# Patient Record
Sex: FEMALE | Race: WHITE | Employment: OTHER | URBAN - METROPOLITAN AREA
[De-identification: names, ages, dates, MRNs, and addresses within clinical notes are randomized per-mention and may not be internally consistent; named-entity substitution may affect disease eponyms.]

---

## 2018-05-10 ENCOUNTER — TRANSFERRED RECORDS (OUTPATIENT)
Dept: HEALTH INFORMATION MANAGEMENT | Facility: CLINIC | Age: 73
End: 2018-05-10

## 2018-05-10 LAB
ALT SERPL-CCNC: 17 IU/L (ref 12–68)
CHOLEST SERPL-MCNC: 172 MG/DL
CREAT SERPL-MCNC: 0.77 MG/DL (ref 0.55–1.02)
GFR SERPL CREATININE-BSD FRML MDRD: >60 ML/MIN/1.73M2
GLUCOSE SERPL-MCNC: 92 MG/DL (ref 74–106)
HDLC SERPL-MCNC: 52 MG/DL
LDLC SERPL CALC-MCNC: 100 MG/DL
POTASSIUM SERPL-SCNC: 3.8 MMOL/L (ref 3.5–5.1)
TRIGL SERPL-MCNC: 101 MG/DL
TSH SERPL-ACNC: 3.03 UIU/ML (ref 0.36–3.74)

## 2018-05-21 ENCOUNTER — TRANSFERRED RECORDS (OUTPATIENT)
Dept: HEALTH INFORMATION MANAGEMENT | Facility: CLINIC | Age: 73
End: 2018-05-21

## 2018-06-25 ENCOUNTER — TRANSFERRED RECORDS (OUTPATIENT)
Dept: HEALTH INFORMATION MANAGEMENT | Facility: CLINIC | Age: 73
End: 2018-06-25

## 2018-07-25 ENCOUNTER — MEDICAL CORRESPONDENCE (OUTPATIENT)
Dept: HEALTH INFORMATION MANAGEMENT | Facility: CLINIC | Age: 73
End: 2018-07-25

## 2018-08-15 ENCOUNTER — PRE VISIT (OUTPATIENT)
Dept: NURSING | Facility: CLINIC | Age: 73
End: 2018-08-15

## 2018-08-15 NOTE — TELEPHONE ENCOUNTER
[facility] CLINICAL DOCUMENTATION    Pre-Visit Planning   PREVISIT INFORMATION                                                    Charles Briscoe scheduled for future visit at [facility] specialty clinics.    Patient is scheduled to see Dr. Mcgowan   on 8/16/18   Reason for visit: Muscle wasting and atrophy, not elsewhere classified, right lower leg  Referring provider Dr. Isidro  Has patient seen previous specialist? Yes.  Name of provider Dr. Lira, Clinic/Facility  In [place]  Medical Records:  will bring history    REVIEW                                                      New patient packet mailed to patient: Yes  Medication reconciliation complete: Yes      No current outpatient prescriptions on file.       Allergies: Review of patient's allergies indicates not on file.    (insert provider dot-phrase for provider specific visit requirements)    PLAN/FOLLOW-UP NEEDED                                                      Previsit review complete.  Patient will see provider at future scheduled appointment.     Patient Reminders Given:  Please, make sure you bring an updated list of your medications.   If you are having a procedure, please, present 15 minutes early.  If you need to cancel or reschedule,please call [phone].    Kriss Toscano

## 2018-08-16 ENCOUNTER — OFFICE VISIT (OUTPATIENT)
Dept: NEUROLOGY | Facility: CLINIC | Age: 73
End: 2018-08-16
Payer: COMMERCIAL

## 2018-08-16 ENCOUNTER — TELEPHONE (OUTPATIENT)
Dept: NEUROLOGY | Facility: CLINIC | Age: 73
End: 2018-08-16

## 2018-08-16 VITALS
WEIGHT: 125.1 LBS | BODY MASS INDEX: 19.63 KG/M2 | HEART RATE: 77 BPM | DIASTOLIC BLOOD PRESSURE: 68 MMHG | HEIGHT: 67 IN | OXYGEN SATURATION: 98 % | SYSTOLIC BLOOD PRESSURE: 120 MMHG

## 2018-08-16 DIAGNOSIS — F40.240 CLAUSTROPHOBIA: Primary | ICD-10-CM

## 2018-08-16 DIAGNOSIS — G35 MULTIPLE SCLEROSIS (H): Primary | ICD-10-CM

## 2018-08-16 LAB
ALBUMIN SERPL-MCNC: 4.4 G/DL (ref 3.4–5)
ALP SERPL-CCNC: 65 U/L (ref 40–150)
ALT SERPL W P-5'-P-CCNC: 24 U/L (ref 0–50)
ANION GAP SERPL CALCULATED.3IONS-SCNC: 6 MMOL/L (ref 3–14)
AST SERPL W P-5'-P-CCNC: 17 U/L (ref 0–45)
BASOPHILS # BLD AUTO: 0.1 10E9/L (ref 0–0.2)
BASOPHILS NFR BLD AUTO: 1.1 %
BILIRUB SERPL-MCNC: 0.5 MG/DL (ref 0.2–1.3)
BUN SERPL-MCNC: 10 MG/DL (ref 7–30)
CALCIUM SERPL-MCNC: 9.2 MG/DL (ref 8.5–10.1)
CHLORIDE SERPL-SCNC: 104 MMOL/L (ref 94–109)
CO2 SERPL-SCNC: 31 MMOL/L (ref 20–32)
CREAT SERPL-MCNC: 0.73 MG/DL (ref 0.52–1.04)
DIFFERENTIAL METHOD BLD: NORMAL
EOSINOPHIL # BLD AUTO: 0 10E9/L (ref 0–0.7)
EOSINOPHIL NFR BLD AUTO: 0.5 %
ERYTHROCYTE [DISTWIDTH] IN BLOOD BY AUTOMATED COUNT: 13.9 % (ref 10–15)
GFR SERPL CREATININE-BSD FRML MDRD: 78 ML/MIN/1.7M2
GLUCOSE SERPL-MCNC: 125 MG/DL (ref 70–99)
HCT VFR BLD AUTO: 43.2 % (ref 35–47)
HGB BLD-MCNC: 13.8 G/DL (ref 11.7–15.7)
IMM GRANULOCYTES # BLD: 0 10E9/L (ref 0–0.4)
IMM GRANULOCYTES NFR BLD: 0.3 %
LYMPHOCYTES # BLD AUTO: 2.4 10E9/L (ref 0.8–5.3)
LYMPHOCYTES NFR BLD AUTO: 37 %
MCH RBC QN AUTO: 28.5 PG (ref 26.5–33)
MCHC RBC AUTO-ENTMCNC: 31.9 G/DL (ref 31.5–36.5)
MCV RBC AUTO: 89 FL (ref 78–100)
MONOCYTES # BLD AUTO: 0.4 10E9/L (ref 0–1.3)
MONOCYTES NFR BLD AUTO: 6.1 %
NEUTROPHILS # BLD AUTO: 3.6 10E9/L (ref 1.6–8.3)
NEUTROPHILS NFR BLD AUTO: 55 %
PLATELET # BLD AUTO: 259 10E9/L (ref 150–450)
POTASSIUM SERPL-SCNC: 4.6 MMOL/L (ref 3.4–5.3)
PROT SERPL-MCNC: 7.7 G/DL (ref 6.8–8.8)
RBC # BLD AUTO: 4.84 10E12/L (ref 3.8–5.2)
SODIUM SERPL-SCNC: 141 MMOL/L (ref 133–144)
TSH SERPL DL<=0.005 MIU/L-ACNC: 3.18 MU/L (ref 0.4–4)
VIT B12 SERPL-MCNC: 569 PG/ML (ref 193–986)
WBC # BLD AUTO: 6.6 10E9/L (ref 4–11)

## 2018-08-16 PROCEDURE — 86787 VARICELLA-ZOSTER ANTIBODY: CPT | Performed by: PSYCHIATRY & NEUROLOGY

## 2018-08-16 PROCEDURE — 86618 LYME DISEASE ANTIBODY: CPT | Performed by: PSYCHIATRY & NEUROLOGY

## 2018-08-16 PROCEDURE — 86431 RHEUMATOID FACTOR QUANT: CPT | Performed by: PSYCHIATRY & NEUROLOGY

## 2018-08-16 PROCEDURE — 82306 VITAMIN D 25 HYDROXY: CPT | Performed by: PSYCHIATRY & NEUROLOGY

## 2018-08-16 PROCEDURE — 82164 ANGIOTENSIN I ENZYME TEST: CPT | Mod: 90 | Performed by: PSYCHIATRY & NEUROLOGY

## 2018-08-16 PROCEDURE — 82607 VITAMIN B-12: CPT | Performed by: PSYCHIATRY & NEUROLOGY

## 2018-08-16 PROCEDURE — 86480 TB TEST CELL IMMUN MEASURE: CPT | Performed by: PSYCHIATRY & NEUROLOGY

## 2018-08-16 PROCEDURE — 86255 FLUORESCENT ANTIBODY SCREEN: CPT | Mod: 90 | Performed by: PSYCHIATRY & NEUROLOGY

## 2018-08-16 PROCEDURE — 40000975 ZZHCL STATISTIC JC VIR AB INDEX INHIB: Mod: 90 | Performed by: PSYCHIATRY & NEUROLOGY

## 2018-08-16 PROCEDURE — 99205 OFFICE O/P NEW HI 60 MIN: CPT | Performed by: PSYCHIATRY & NEUROLOGY

## 2018-08-16 PROCEDURE — 83516 IMMUNOASSAY NONANTIBODY: CPT | Mod: 59 | Performed by: PSYCHIATRY & NEUROLOGY

## 2018-08-16 PROCEDURE — 86704 HEP B CORE ANTIBODY TOTAL: CPT | Performed by: PSYCHIATRY & NEUROLOGY

## 2018-08-16 PROCEDURE — 87340 HEPATITIS B SURFACE AG IA: CPT | Performed by: PSYCHIATRY & NEUROLOGY

## 2018-08-16 PROCEDURE — 86235 NUCLEAR ANTIGEN ANTIBODY: CPT | Performed by: PSYCHIATRY & NEUROLOGY

## 2018-08-16 PROCEDURE — 87522 HEPATITIS C REVRS TRNSCRPJ: CPT | Performed by: PSYCHIATRY & NEUROLOGY

## 2018-08-16 PROCEDURE — 99000 SPECIMEN HANDLING OFFICE-LAB: CPT | Performed by: PSYCHIATRY & NEUROLOGY

## 2018-08-16 PROCEDURE — 86706 HEP B SURFACE ANTIBODY: CPT | Performed by: PSYCHIATRY & NEUROLOGY

## 2018-08-16 PROCEDURE — 86038 ANTINUCLEAR ANTIBODIES: CPT | Performed by: PSYCHIATRY & NEUROLOGY

## 2018-08-16 PROCEDURE — 36415 COLL VENOUS BLD VENIPUNCTURE: CPT | Performed by: PSYCHIATRY & NEUROLOGY

## 2018-08-16 PROCEDURE — 86780 TREPONEMA PALLIDUM: CPT | Performed by: PSYCHIATRY & NEUROLOGY

## 2018-08-16 PROCEDURE — 84207 ASSAY OF VITAMIN B-6: CPT | Mod: 90 | Performed by: PSYCHIATRY & NEUROLOGY

## 2018-08-16 PROCEDURE — 87389 HIV-1 AG W/HIV-1&-2 AB AG IA: CPT | Performed by: PSYCHIATRY & NEUROLOGY

## 2018-08-16 PROCEDURE — 83516 IMMUNOASSAY NONANTIBODY: CPT | Performed by: PSYCHIATRY & NEUROLOGY

## 2018-08-16 PROCEDURE — 80050 GENERAL HEALTH PANEL: CPT | Performed by: PSYCHIATRY & NEUROLOGY

## 2018-08-16 RX ORDER — DIAZEPAM 5 MG
TABLET ORAL
Qty: 2 TABLET | Refills: 0 | COMMUNITY
Start: 2018-08-16 | End: 2018-08-28

## 2018-08-16 NOTE — MR AVS SNAPSHOT
After Visit Summary   8/16/2018    Marilee Briscoe    MRN: [id number]           Patient Information     Date Of Birth          1945        Visit Information        Provider Department      8/16/2018 9:00 AM Aly Mcgowan MD [facility]        Today's Diagnoses     Multiple sclerosis (H)    -  1      Care Instructions    Will get a MRI of you brain and spine    Will get blood work    You saw a neurology provider, Aly Mcgowan, today at the [facility].  You may have also met with the MS RN Care Coordinator.  In order to get a message to your MS Center provider, you should contact [phone]. You should contact us via this protocol if you have any of the following symptoms:    New or worsening neurologic symptoms that persist for 24-48 hours, such as:  o New onset of pain or marked worsening of pain  o Difficulty with speech, swallowing, or breathing  o New onset of vertigo or dizziness  o Change in bowel or bladder function (incontinence, difficulty urinating)    Increasing difficulty in self care    Marked changes in vision (double vision, blurred vision, graying of vision)    Change in mobility    Change in cognitive function    Falling    Worsening numbness, tingling or pain with a change in function    Worsening fatigue lasting more than 2 weeks  If you had labs completed today, we will contact you with the results.  If you are active in [employer], they will be released to you there.  Otherwise, your results will be provided to you via mail or telephone call.  Some results take up to 2 weeks for completion.  If you haven t heard anything about your lab results within 2 weeks, you can call or send a [employer] message to obtain your results.  If you have an MRI scheduled in the week or two prior to your next appointment, we will go over the results at your scheduled follow up appointment.  If you are not scheduled to see your MS  Center provider within about 2 weeks after your MRI, please call or send a [employer] message to obtain your results if you haven t heard anything within 2 weeks.  Please be aware that it takes at least 5 business days after routine MRIs for your results to be reviewed by both the radiologist and your doctor.  MRIs completed at facilities outside of the [facility] system take about 2 weeks in order for the MRI disc to be mailed to our clinic and uploaded into your medical record.    Please contact your pharmacy to request refills of your medications.   Please do your best to come to your appointments, and to arrive 15 minutes early to allow time for checking in.  [facility] reserves the right to terminate care of established patients if a patient misses three or more appointments in a clinic without providing notification within a 12-month period.    Developing Your Care Team  Individuals living with chronic illnesses like MS may be unaware that they are at risk for the same range of medical problems as everyone else.  This is why you must establish a relationship with other health care providers in addition to your Multiple Sclerosis doctor.  It can be difficult at times to figure out whether a health concern is related to your MS, or whether it is related to something else, such as hormonal changes, pseduoexacerbations, changes in your core body temperature, flu-like reactions to interferons, exercise, or infections.  Urinary tract infections (UTIs) are common culprits that can cause fatigue, weakness, or other  MS attack -like symptoms without classic symptoms of a UTI.  For this reason, if you call or come in to discuss symptoms, you may be asked to get in touch with your primary provider or another specialist, so that you receive the comprehensive care you need.  What is Multiple Sclerosis (MS)?  MS is a disease in which the nerve tissues in the brain and/or spinal cord are attacked by immune  cells in the body.  These immune cells are present in everyone, and their normal role is to fight off infections.  In people with MS, these cells change the way they function and cross into the nervous system.  Once there, they cause inflammation that damages the myelin (or the protective coating of a nerve cell, much like the plastic covering on an electrical cord) and parts of the nerve cell itself.  So far, a clear cause for this immune system dysfunction has not been found.  MS often starts out as the  relapsing-remitting  form.  This means there are episodes when you have symptoms, and other times when you recover to normal or near-normal.  Over time, if the damage to the nervous system continues, the disease can cause additional disability, such as difficulty walking.  If the relapses and nerve damage can be prevented with available medications, many patients with MS can go many years between relapses and have relatively little disability.  Remember: MS is a condition that changes and must be evaluated on an ongoing basis!  What is a Relapse? (Also called flare-ups, attacks, or exacerbations)  Relapses are due to the occurrence of inflammation in some part of the brain and/or spinal cord.  A relapse is new or recurrent symptoms which persist for at least 24 hours and sometimes worsen over 48 hours.  New symptoms need to be  by at least 1 month in order to be considered separate relapses. Most of the time, symptoms reach their maximal intensity within 2 weeks and then begin to slowly resolve.  At times, your symptoms may not recover fully for up to 6 months, depending on the severity of the episode.  The frequency of relapses is generally higher early in the disease, but can vary greatly among individuals with MS.  Improvement of symptoms for an individual is unpredictable with each relapse.    It is important to remember that an increase in symptoms and changes in function may not necessarily be a  relapse.  There are other factors that contribute to such changes, such as hot weather, increased body temperature, infection/illness, stress and sleep deprivation.  The worsening of symptoms may feel like a relapse when in reality it is not.  These episodes are referred to as pseudorelapses.  Once the underlying cause is addressed, symptoms usually fade away and you feel better.  If you experience a worsening of symptoms that lasts more than 48 hours and does not improve with cooling down, decreasing stress, or treatment of an infection, please call us and we can help to better determine whether you are having a pseudorelapse versus a relapse.                    Follow-ups after your visit        Follow-up notes from your care team     Return in about 2 weeks (around 8/30/2018).      Your next 10 appointments already scheduled     Aug 27, 2018 11:00 AM CDT   MR BRAIN W/O & W CONTRAST with MGMR1   [facility]    [id number] [place] [id number] [phone]           Take your medicines as usual, unless your doctor tells you not to. Bring a list of your current medicines to your exam (including vitamins, minerals and over-the-counter drugs).  You may or may not receive intravenous (IV) contrast for this exam pending the discretion of the Radiologist.  You do not need to do anything special to prepare.  The MRI machine uses a strong magnet. Please wear clothes without metal (snaps, zippers). A sweatsuit works well, or we may give you a hospital gown.  Please remove any body piercings and hair extensions before you arrive. You will also remove watches, jewelry, hairpins, wallets, dentures, partial dental plates and hearing aids. You may wear contact lenses, and you may be able to wear your rings. We have a safe place to keep your personal items, but it is safer to leave them at home.  **IMPORTANT** THE INSTRUCTIONS BELOW ARE ONLY FOR THOSE PATIENTS WHO  HAVE BEEN PRESCRIBED SEDATION OR GENERAL ANESTHESIA DURING THEIR MRI PROCEDURE:  IF YOUR DOCTOR PRESCRIBED ORAL SEDATION (take medicine to help you relax during your exam):   You must get the medicine from your doctor (oral medication) before you arrive. Bring the medicine to the exam. Do not take it at home. You ll be told when to take it upon arriving for your exam.   Arrive one hour early. Bring someone who can take you home after the test. Your medicine will make you sleepy. After the exam, you may not drive, take a bus or take a taxi by yourself.  IF YOUR DOCTOR PRESCRIBED IV SEDATION:   Arrive one hour early. Bring someone who can take you home after the test. Your medicine will make you sleepy. After the exam, you may not drive, take a bus or take a taxi by yourself.   No eating 6 hours before your exam. You may have clear liquids up until 4 hours before your exam. (Clear liquids include water, clear tea, black coffee and fruit juice without pulp.)  IF YOUR DOCTOR PRESCRIBED ANESTHESIA (be asleep for your exam):   Arrive 1 1/2 hours early. Bring someone who can take you home after the test. You may not drive, take a bus or take a taxi by yourself.   No eating 8 hours before your exam. You may have clear liquids up until 4 hours before your exam. (Clear liquids include water, clear tea, black coffee and fruit juice without pulp.)   You will spend four to five hours in the recovery room.  Please call the Imaging Department at your exam site with any questions.            Aug 27, 2018 11:45 AM CDT   MR CERVICAL SPINE W/O CONTRAST with MGMR1   [facility] ([facility])    [address] [place] [id number] [phone]           Take your medicines as usual, unless your doctor tells you not to. Bring a list of your current medicines to your exam (including vitamins, minerals and over-the-counter drugs). Also bring the results of similar scans you may have had.   Please remove any body piercings and hair extensions before you arrive.  Follow your doctor s orders. If you do not, we may have to postpone your exam.  You may or may not receive IV contrast for this exam pending the discretion of the Radiologist.  You do not need to do anything special to prepare.  The MRI machine uses a strong magnet. Please wear clothes without metal (snaps, zippers). A sweatsuit works well, or we may give you a hospital gown.   **IMPORTANT** THE INSTRUCTIONS BELOW ARE ONLY FOR THOSE PATIENTS WHO HAVE BEEN PRESCRIBED SEDATION OR GENERAL ANESTHESIA DURING THEIR MRI PROCEDURE:  IF YOUR DOCTOR PRESCRIBED ORAL SEDATION (take medicine to help you relax during your exam):   You must get the medicine from your doctor (oral medication) before you arrive. Bring the medicine to the exam. Do not take it at home. You ll be told when to take it upon arriving for your exam.   Arrive one hour early. Bring someone who can take you home after the test. Your medicine will make you sleepy. After the exam, you may not drive, take a bus or take a taxi by yourself.  IF YOUR DOCTOR PRESCRIBED IV SEDATION:   Arrive one hour early. Bring someone who can take you home after the test. Your medicine will make you sleepy. After the exam, you may not drive, take a bus or take a taxi by yourself.   No eating 6 hours before your exam. You may have clear liquids up until 4 hours before your exam. (Clear liquids include water, clear tea, black coffee and fruit juice without pulp.)  IF YOUR DOCTOR PRESCRIBED ANESTHESIA (be asleep for your exam):   Arrive 1 1/2 hours early. Bring someone who can take you home after the test. You may not drive, take a bus or take a taxi by yourself.   No eating 8 hours before your exam. You may have clear liquids up until 4 hours before your exam. (Clear liquids include water, clear tea, black coffee and fruit juice without pulp.)   You will spend four to five hours in the recovery room.  Please  call the Imaging Department at your exam site with any questions.            Aug 27, 2018 12:30 PM CDT   MR THORACIC SPINE W/O & W CONTRAST with MGMR1   [facility] ([facility])    [address] [place] [id number] [phone]           Take your medicines as usual, unless your doctor tells you not to. Bring a list of your current medicines to your exam (including vitamins, minerals and over-the-counter drugs).  You may or may not receive intravenous (IV) contrast for this exam pending the discretion of the Radiologist.  You do not need to do anything special to prepare.  The MRI machine uses a strong magnet. Please wear clothes without metal (snaps, zippers). A sweatsuit works well, or we may give you a hospital gown.  Please remove any body piercings and hair extensions before you arrive. You will also remove watches, jewelry, hairpins, wallets, dentures, partial dental plates and hearing aids. You may wear contact lenses, and you may be able to wear your rings. We have a safe place to keep your personal items, but it is safer to leave them at home.  **IMPORTANT** THE INSTRUCTIONS BELOW ARE ONLY FOR THOSE PATIENTS WHO HAVE BEEN PRESCRIBED SEDATION OR GENERAL ANESTHESIA DURING THEIR MRI PROCEDURE:  IF YOUR DOCTOR PRESCRIBED ORAL SEDATION (take medicine to help you relax during your exam):   You must get the medicine from your doctor (oral medication) before you arrive. Bring the medicine to the exam. Do not take it at home. You ll be told when to take it upon arriving for your exam.   Arrive one hour early. Bring someone who can take you home after the test. Your medicine will make you sleepy. After the exam, you may not drive, take a bus or take a taxi by yourself.  IF YOUR DOCTOR PRESCRIBED IV SEDATION:   Arrive one hour early. Bring someone who can take you home after the test. Your medicine will make you sleepy. After the exam, you may not drive, take a bus or  take a taxi by yourself.   No eating 6 hours before your exam. You may have clear liquids up until 4 hours before your exam. (Clear liquids include water, clear tea, black coffee and fruit juice without pulp.)  IF YOUR DOCTOR PRESCRIBED ANESTHESIA (be asleep for your exam):   Arrive 1 1/2 hours early. Bring someone who can take you home after the test. You may not drive, take a bus or take a taxi by yourself.   No eating 8 hours before your exam. You may have clear liquids up until 4 hours before your exam. (Clear liquids include water, clear tea, black coffee and fruit juice without pulp.)   You will spend four to five hours in the recovery room.  Please call the Imaging Department at your exam site with any questions.            Aug 27, 2018  2:00 PM CDT   Return Visit with Aly Mcgowan MD   [facility] ([facility])    [address] [place] [id number] [phone]              Future tests that were ordered for you today     Open Future Orders        Priority Expected Expires Ordered    MR Brain w/o & w Contrast Routine  8/16/2019 8/16/2018    MR Cervical Spine w/o Contrast Routine  8/16/2019 8/16/2018    MR Thoracic Spine w/o & w Contrast Routine  8/16/2019 8/16/2018            Who to contact     If you have questions or need follow up information about today's clinic visit or your schedule please contact [facility] directly at [phone].  Normal or non-critical lab and imaging results will be communicated to you by MyChart, letter or phone within 4 business days after the clinic has received the results. If you do not hear from us within 7 days, please contact the clinic through [employer] or phone. If you have a critical or abnormal lab result, we will notify you by phone as soon as possible.  Submit refill requests through [employer] or call your pharmacy and they will forward the refill request to us. Please allow 3 business days for  "your refill to be completed.          Additional Information About Your Visit        [facility] - [employer] Information     [employer] is an electronic gateway that provides easy, online access to your medical records. With [employer], you can request a clinic appointment, read your test results, renew a prescription or communicate with your care team.     To sign up for [employer] visit the website at [email or web address]   You will be asked to enter the access code listed below, as well as some personal information. Please follow the directions to create your username and password.     Your access code is: [email or web address]  Expires: 2018 10:32 AM     Your access code will  in 90 days. If you need help or a new code, please contact your [facility] or call [phone] for assistance.        Care EveryWhere ID     This is your Care EveryWhere ID. This could be used by other organizations to access your [facility] medical records  [id number]        Your Vitals Were     Pulse Height Pulse Oximetry BMI (Body Mass Index)          77 1.702 m (5' 7\") 98% 19.59 kg/m2         Blood Pressure from Last 3 Encounters:   18 120/68    Weight from Last 3 Encounters:   18 56.7 kg (125 lb 1.6 oz)              We Performed the Following     Angiotensin converting enzyme     Anti Nuclear Violetta IgG by IFA with Reflex     CBC with platelets differential     Comprehensive metabolic panel     Hepatitis B core antibody     Hepatitis B Surface Antibody     Hepatitis B surface antigen     Hepatitis C RNA quantitative     HIV Antigen Antibody Combo     ALEX Virus Ab Index Reflex Inhibition     Lyme Disease Violetta with reflex to WB Serum     M Tuberculosis by Quantiferon     [place] Send out. Test ID: CSI1 Reporting Name: CNS Demyelinating Disease Interp, S as: Laboratory Miscellaneous Order     Rheumatoid factor     Mcgowan YESSY Antibody IgG     SSA Ro YESSY Antibody IgG     SSB La YESSY Antibody IgG     Tissue " transglutaminase david IgA and IgG     Treponema Abs w Reflex to RPR and Titer     TSH with free T4 reflex     Varicella Zoster Virus Antibody IgG     Vitamin B12     Vitamin B6     Vitamin D Deficiency        Primary Care Provider Fax #    Physician No Ref-Primary [phone]       No address on file        Equal Access to Services     AWILDAPITER MICHAEL : Hadii aad ku hadcampbellnavid Sokiaraali, waaxda luqadaha, qaybta kaalmada adeegyada, marcelino colby florecitayamile ozunaмария langleykang gutiérrez. So [facility] [phone].    ATENCIÓN: Si habla español, tiene a corona disposición servicios gratuitos de asistencia lingüística. Llame al [phone].    We comply with applicable federal civil rights laws and [place] laws. We do not discriminate on the basis of race, color, national origin, age, disability, sex, sexual orientation, or gender identity.            Thank you!     Thank you for choosing [facility]  for your care. Our goal is always to provide you with excellent care. Hearing back from our patients is one way we can continue to improve our services. Please take a few minutes to complete the written survey that you may receive in the mail after your visit with us. Thank you!             Your Updated Medication List - Protect others around you: Learn how to safely use, store and throw away your medicines at www.disposemymeds.org.          This list is accurate as of 8/16/18 10:32 AM.  Always use your most recent med list.                   Brand Name Dispense Instructions for use Diagnosis    MULTI ADULT GUMMIES PO

## 2018-08-16 NOTE — TELEPHONE ENCOUNTER
Patient is scheduled for 3 MRI's on 8/27/18 and would like an oral sedative to help her relax. She will have a , is aware of the protocol. She uses the [facility] in [place], and would like the prescription sent there.    She will see Dr. Mcgowan after the MRI's at 2 p.m., on 8/27/18. Please call the patient with any questions. Thank you.    [facility]ma [facility]~Specialty/Med Surg   [phone]

## 2018-08-16 NOTE — TELEPHONE ENCOUNTER
Order placed and called into the pt's pharmacy per MS medication refill protocol. The pt was notified on her VM.  Gina Alford RN

## 2018-08-16 NOTE — PROGRESS NOTES
THE [facility] MULTIPLE SCLEROSIS CLINIC  NEW PATIENT EVALUATION/CONSULTATION    Referral source:   Dwaine  [address] / [place]*      Also followed by:   Physician No Ref-Primary  No address on file      PRINCIPAL NEUROLOGIC DIAGNOSIS: Deferred    DISEASE SUMMARY  Date of onset: 2008  Date of diagnosis of MS: 2018  Disease course at onset: Primary Progressive  Current disease course: Primary Progressive  Previous disease therapies: NA  Current disease therapy: NA  Most recent MRI brain:5/3/2016   Most recent MRI cervical spine: 6/26/16  CSF: NA  JCV serology result and date: NA      HISTORY OF ILLNESS:    An opinion on this year old right handed genetic female  was requested by Dr. Gabby Isidro for evaluation of 10 year progressive history of right sided weakness.. The patient was accompanied by her daughters and her . Previous records (physician notes, laboratory reports, and radiology reports) and imaging studies were reviewed and summarized. My recommendations will be communicated back to the patient's physician(s) by mail.  Follow-up is expected to be with me.        The patient first noticed symptoms back in 2007. The patient first noticed that she noted lack of control of her leg. Atfter walking for long period of time, she reports that she had no control of her leg. She reported that she had back pain. The patient reports that she was folowing with her PCP. Due to back discomfort, she we was referred to surgery. She was evaluated and it was not felt to be surgical. She reports that referred to neurology.  The patient had a EMG which demonstrated an L5-S1 nerve root lesion. Her MRI back in 2011 that did not say anything about surgical problem. She then went to Dr. Sigala who got a MRI of the cervical spine, which demonstrated multiple T2 lesion in her cervical spine. She was recommend to have a spinal tap; however, she did not want to do this.   The patient recently established with a new primary care.  Her primary care was uncertain what the cause of her symptoms were.  Therefore, her primary care recommended a referral to [place] to be evaluated.  The patient was rejected by mail for evaluation.  Therefore, the patient was referred to the [facility]. The patient reports that she is presenting today for evlauation of her symptoms        Current Symptoms:  1.Weakness/Cordination: The patient reports that she is having difficulties with specific motions. The patient reports that reports that she her hand has been weakening over 2-3 years. Her leg has been weakening over 10 years. She report that she has difficulty with specific task with her hands. She reports that she will need to circumduction the legs. She needs to walk on a smooth flat surface or she will become unstable. She has good days and bad days. She reports that she walks better in the morning. She reports that this gets worse towards the end of the day. The patient reports that she gets with worse with exertion. She denies falls or trips. Her family feels that she would fall if she was not careful. The patient          PAST HISTORY:  No past medical history on file.    No past surgical history on file.           Current Outpatient Prescriptions:  Current Outpatient Prescriptions   Medication     Multiple Vitamins-Minerals (MULTI ADULT GUMMIES PO)     No current facility-administered medications for this visit.           ALLERGIES       Allergies   Allergen Reactions     Codeine Nausea         Social History    Social History     Social History     Marital status:      Spouse name: N/A     Number of children: N/A     Years of education: N/A     Occupational History     Not on file.     Social History Main Topics     Smoking status: Never Smoker     Smokeless tobacco: Never Used     Alcohol use Not on file     Drug use: Not on file     Sexual activity: Not on file     Other Topics  Concern     Not on file     Social History Narrative     No narrative on file         FAMILY HISTORY     No family history on file.      REVIEW OF SYSTEMS:    Comprehensive review of systems otherwise was negative, including constitutional, head and neck, cardiovascular, pulmonary, gastrointestinal, endocrine, urologic, reproductive, rheumatic, hematologic, immunologic, dermatologic, and psychiatric.    Muscle tenderness, osteroporpsos, difficulty sleeping, anxiety, easy bruising, poor circulation, sweollem legs and feet    Nutritional concerns: None  Driving issues: None   Safety concerns regarding living situations and safety at home: None  Risk of falls: None  Pain: None    PHYSICAL EXAM:    Hair, skin, nails, and joints were normal. Neck was supple without Lhermitte's phenomenon.  There was no percussion tenderness over the spine.     The patient was alert and oriented to person, place, and time with normal language, attention and concentration, recent and remote memory, praxis, and intellectual function. Affect was normal. The patient did not appear depressed.    Visual acuity:  OD 20/20  OS 20/20    Correction: with glasses    Visual fields were full to confrontation.   Pupils were 4 mm and briskly reactive OU without a relative afferent pupillary defect.  Funduscopic examination was normal without disc edema, erythema, or atrophy.  Extraocular movements: Intact without OTILIO  Facial sensation is normal. Normal strength of the muscles of mastication:   Muscles of facial expression were normal  Hearing was normal. Gag reflex and palatal movements were normal. Sternocleidomastoid and trapezius power were normal. Tongue movements were normal. There was no dysarthria.    Motor Examination:   There was no pronator drift.       Motor    Upper      Right Left   Shoulder Abduction 4 5   Elbow Flexion 4 5   Elbow Extension 4 5   Wrist Extension 4 4   Digit Extension 4 5   Digit Flexion 4 5   APB 4 5   Tone 2 1   Lower        Right Left   Hip Flexion 4 5   Knee Extension 4 5   Knee Flexion 4 5   Foot Dorsiflexion 4 5   Foot Plantar Flexion 4 5   EH 4 5   Toe Flexion 4 5   Tone 1+ 0           Grade Description   0 No increase in muscle tone   1 Slight increase in muscle tone, manifested by a catch and release or by minimal resistance at the end of the range of motion when the affected part(s) is moved in flexion or extension   1+ Slight increase in muscle tone, manifested by a catch, followed by minimal resistance throughout the remainder (less than half) of the ROM   2 More marked increase in muscle tone through most of the ROM, but affected part(s) easily moved   3 Considerable increase in muscle tone, passive movement difficult   4 Affected part(s) rigid in flexion or extension             Reflexes:     Reflexes       Right  Left   Biceps 2  2   Triceps 2  2   Brachioradialis 2  2   Patellar  3  2   Achilles 3  2   Babinski mute  down         Coordination:     Right Left   RRM moderately impaired Normal   BENIGNO moderately impaired Normal   FTN moderately impaired Normal   RRM mildly impaired Normal   HKS moderately impaired Normal         Sensory examination:    Light touch:  Intact in all extremities, Pin Prick:  Intact in all extremities, Vibration:   Intact in all extremities, Prioperception:  Intact in all extremities and Temperature:  Intact in all extremities      Coordination and Gait        Gait mildly impaired   Right Left   Romberg mildly impaired  Heel moderately impaired moderately impaired   Tandem {moderately impaired  Toe moderately impaired moderately impaired                   QUANTITATIVE SCORES:    Visual: 0-Normal  Brainstem: 1-Signs only  Pyramidal: 3-mild to moderate paraparesis or hemiparesis: usually BMRC grade 4 in more than two muscle groups; and/or BMRC grade 3 in one or two muscle groups (movements against gravity are possible);and/or severe monoparesis: BMRC grade 2 or less in one muscle group  Cerebellar:  2- mild ataxia and/or moderate station ataxia (Romberg) and / or tandem walking not possible  Sensory: 0-Normal  Bladder/Bowel: 0-Normal  Cerebral: 0-Normal  Ambulatory: 0-Unrestricted    EDSS: 3.5- fully ambulatory but with moderate disability in one FS (one FS grade 3) and mild disability in one or two FS (one / two FS grade 2) and others 0 or 1; or fully ambulatory with two FS grade 3 (others 0 or 1);or fully ambulatory with five FS grade 2 (others 0 or 1)               REVIEW OF OUTSIDE RECORDS:  5/3/2016 MRI brain normal  6/30/26: NMO negative, nm zinc, copper, ACE, SPEP, CRP, Vitamin b12, esr, T4, lyme, Hemogloblin a1c, camille  MRI cervical spine: multiple T2 lesion within the cervical spine 6/2/16    emg    right side l4-l5 and l5-s1 radiculoapthy with ongoing ddenervatyion at lumbar paraspinal regions  Right chronic c7-c8 9/8/2011  REVIEW OF IMAGING STUDIES:    I personally reviewed the following images:    MRI cervical spine: Multiple T2 lesions in the cervical spine.    MRI brain: Unremarkable    ASSESSMENT:  The patient is a 73-year-old woman with a past medical history of ten-year progressive right-sided weakness who is presenting today as a consult to be evaluated for the stated condition.  After reviewing her history, her exam, her imaging, and her blood work, the most likely explanation of her symptoms is primary progressive multiple sclerosis.  Further workup needs to be done to confirm this diagnosis.  I will start with getting an MRI of the brain and Cervical spine. If there is evolution of her MRI, then the diagnosis would be confirmed.  If her MRI is unchanged, then it may be worthwhile to consider a spinal tap to look for oligoclonal bands.  In the meantime I will also screen the patient for potential mimickers of multiple sclerosis.  I explained my reasoning and reviewed the MRIs with the patient.  I will tentatively follow the patient up in 2 weeks.  I instructed the patient to call my office  with any questions or concerns.      PLAN:    MRI of the brain and cervical spine with and without contrast  CMP, CBC +diff, VZV IgG, JCV+index, remote hepatitis panel, TB quant, Vitamin D, Vitamin b12, folate, TSH, and copper       Finally I will follow the patient up in 2 week(s) as long as the patient is doing well. I instructed the patient to call or mychart my office with any concerns or questions.    I spent 60 minutes in this visit, with >50% direct patient time spent counseling about prognosis, treatment options, and coordination of care.    Aly Mcgowan MD [facility]  Staff Neurologist   08/16/18       (Chart documentation was completed in part with Dragon voice-recognition software. Even though reviewed, some grammatical, spelling, and word errors may remain.)

## 2018-08-16 NOTE — LETTER
8/16/2018         RE: Marilee Briscoe  [address]  [facility] [id number]        Dear Colleague,    Thank you for referring your patient, Marilee Briscoe, to the [facility]. Please see a copy of my visit note below.    THE [facility] MULTIPLE SCLEROSIS CLINIC  NEW PATIENT EVALUATION/CONSULTATION    Referral source:   Dwaine  [address] / [place]*      Also followed by:   Physician No Ref-Primary  No address on file      PRINCIPAL NEUROLOGIC DIAGNOSIS: Deferred    DISEASE SUMMARY  Date of onset: 2008  Date of diagnosis of MS: 2018  Disease course at onset: Primary Progressive  Current disease course: Primary Progressive  Previous disease therapies: NA  Current disease therapy: NA  Most recent MRI brain:5/3/2016   Most recent MRI cervical spine: 6/26/16  CSF: NA  JCV serology result and date: NA      HISTORY OF ILLNESS:    An opinion on this year old right handed genetic female  was requested by Dr. Gabby Isidro for evaluation of 10 year progressive history of right sided weakness.. The patient was accompanied by her daughters and her . Previous records (physician notes, laboratory reports, and radiology reports) and imaging studies were reviewed and summarized. My recommendations will be communicated back to the patient's physician(s) by mail.  Follow-up is expected to be with me.        The patient first noticed symptoms back in 2007. The patient first noticed that she noted lack of control of her leg. Atfter walking for long period of time, she reports that she had no control of her leg. She reported that she had back pain. The patient reports that she was folowing with her PCP. Due to back discomfort, she we was referred to surgery. She was evaluated and it was not felt to be surgical. She reports that referred to neurology.  The patient had a EMG which demonstrated an L5-S1 nerve root lesion. Her MRI back in 2011 that  did not say anything about surgical problem. She then went to Dr. Sigala who got a MRI of the cervical spine, which demonstrated multiple T2 lesion in her cervical spine. She was recommend to have a spinal tap; however, she did not want to do this.  The patient recently established with a new primary care.  Her primary care was uncertain what the cause of her symptoms were.  Therefore, her primary care recommended a referral to [place] to be evaluated.  The patient was rejected by mail for evaluation.  Therefore, the patient was referred to the [facility]. The patient reports that she is presenting today for evlauation of her symptoms        Current Symptoms:  1.Weakness/Cordination: The patient reports that she is having difficulties with specific motions. The patient reports that reports that she her hand has been weakening over 2-3 years. Her leg has been weakening over 10 years. She report that she has difficulty with specific task with her hands. She reports that she will need to circumduction the legs. She needs to walk on a smooth flat surface or she will become unstable. She has good days and bad days. She reports that she walks better in the morning. She reports that this gets worse towards the end of the day. The patient reports that she gets with worse with exertion. She denies falls or trips. Her family feels that she would fall if she was not careful. The patient          PAST HISTORY:  No past medical history on file.    No past surgical history on file.           Current Outpatient Prescriptions:  Current Outpatient Prescriptions   Medication     Multiple Vitamins-Minerals (MULTI ADULT GUMMIES PO)     No current facility-administered medications for this visit.           ALLERGIES       Allergies   Allergen Reactions     Codeine Nausea         Social History    Social History     Social History     Marital status:      Spouse name: N/A     Number of children: N/A     Years of  education: N/A     Occupational History     Not on file.     Social History Main Topics     Smoking status: Never Smoker     Smokeless tobacco: Never Used     Alcohol use Not on file     Drug use: Not on file     Sexual activity: Not on file     Other Topics Concern     Not on file     Social History Narrative     No narrative on file         FAMILY HISTORY     No family history on file.      REVIEW OF SYSTEMS:    Comprehensive review of systems otherwise was negative, including constitutional, head and neck, cardiovascular, pulmonary, gastrointestinal, endocrine, urologic, reproductive, rheumatic, hematologic, immunologic, dermatologic, and psychiatric.    Muscle tenderness, osteroporpsos, difficulty sleeping, anxiety, easy bruising, poor circulation, sweollem legs and feet    Nutritional concerns: None  Driving issues: None   Safety concerns regarding living situations and safety at home: None  Risk of falls: None  Pain: None    PHYSICAL EXAM:    Hair, skin, nails, and joints were normal. Neck was supple without Lhermitte's phenomenon.  There was no percussion tenderness over the spine.     The patient was alert and oriented to person, place, and time with normal language, attention and concentration, recent and remote memory, praxis, and intellectual function. Affect was normal. The patient did not appear depressed.    Visual acuity:  OD 20/20  OS 20/20    Correction: with glasses    Visual fields were full to confrontation.   Pupils were 4 mm and briskly reactive OU without a relative afferent pupillary defect.  Funduscopic examination was normal without disc edema, erythema, or atrophy.  Extraocular movements: Intact without OTILIO  Facial sensation is normal. Normal strength of the muscles of mastication:   Muscles of facial expression were normal  Hearing was normal. Gag reflex and palatal movements were normal. Sternocleidomastoid and trapezius power were normal. Tongue movements were normal. There was no  dysarthria.    Motor Examination:   There was no pronator drift.       Motor    Upper      Right Left   Shoulder Abduction 4 5   Elbow Flexion 4 5   Elbow Extension 4 5   Wrist Extension 4 4   Digit Extension 4 5   Digit Flexion 4 5   APB 4 5   Tone 2 1   Lower       Right Left   Hip Flexion 4 5   Knee Extension 4 5   Knee Flexion 4 5   Foot Dorsiflexion 4 5   Foot Plantar Flexion 4 5   EH 4 5   Toe Flexion 4 5   Tone 1+ 0           Grade Description   0 No increase in muscle tone   1 Slight increase in muscle tone, manifested by a catch and release or by minimal resistance at the end of the range of motion when the affected part(s) is moved in flexion or extension   1+ Slight increase in muscle tone, manifested by a catch, followed by minimal resistance throughout the remainder (less than half) of the ROM   2 More marked increase in muscle tone through most of the ROM, but affected part(s) easily moved   3 Considerable increase in muscle tone, passive movement difficult   4 Affected part(s) rigid in flexion or extension             Reflexes:     Reflexes       Right  Left   Biceps 2  2   Triceps 2  2   Brachioradialis 2  2   Patellar  3  2   Achilles 3  2   Babinski mute  down         Coordination:     Right Left   RRM moderately impaired Normal   BENIGNO moderately impaired Normal   FTN moderately impaired Normal   RRM mildly impaired Normal   HKS moderately impaired Normal         Sensory examination:    Light touch:  Intact in all extremities, Pin Prick:  Intact in all extremities, Vibration:   Intact in all extremities, Prioperception:  Intact in all extremities and Temperature:  Intact in all extremities      Coordination and Gait        Gait mildly impaired   Right Left   Romberg mildly impaired  Heel moderately impaired moderately impaired   Tandem {moderately impaired  Toe moderately impaired moderately impaired                   QUANTITATIVE SCORES:    Visual: 0-Normal  Brainstem: 1-Signs only  Pyramidal:  3-mild to moderate paraparesis or hemiparesis: usually BMRC grade 4 in more than two muscle groups; and/or BMRC grade 3 in one or two muscle groups (movements against gravity are possible);and/or severe monoparesis: BMRC grade 2 or less in one muscle group  Cerebellar: 2- mild ataxia and/or moderate station ataxia (Romberg) and / or tandem walking not possible  Sensory: 0-Normal  Bladder/Bowel: 0-Normal  Cerebral: 0-Normal  Ambulatory: 0-Unrestricted    EDSS: 3.5- fully ambulatory but with moderate disability in one FS (one FS grade 3) and mild disability in one or two FS (one / two FS grade 2) and others 0 or 1; or fully ambulatory with two FS grade 3 (others 0 or 1);or fully ambulatory with five FS grade 2 (others 0 or 1)               REVIEW OF OUTSIDE RECORDS:  5/3/2016 MRI brain normal  6/30/26: NMO negative, nm zinc, copper, ACE, SPEP, CRP, Vitamin b12, esr, T4, lyme, Hemogloblin a1c, camille  MRI cervical spine: multiple T2 lesion within the cervical spine 6/2/16    emg    right side l4-l5 and l5-s1 radiculoapthy with ongoing ddenervatyion at lumbar paraspinal regions  Right chronic c7-c8 9/8/2011  REVIEW OF IMAGING STUDIES:    I personally reviewed the following images:    MRI cervical spine: Multiple T2 lesions in the cervical spine.    MRI brain: Unremarkable    ASSESSMENT:  The patient is a 73-year-old woman with a past medical history of ten-year progressive right-sided weakness who is presenting today as a consult to be evaluated for the stated condition.  After reviewing her history, her exam, her imaging, and her blood work, the most likely explanation of her symptoms is primary progressive multiple sclerosis.  Further workup needs to be done to confirm this diagnosis.  I will start with getting an MRI of the brain and Cervical spine. If there is evolution of her MRI, then the diagnosis would be confirmed.  If her MRI is unchanged, then it may be worthwhile to consider a spinal tap to look for oligoclonal  bands.  In the meantime I will also screen the patient for potential mimickers of multiple sclerosis.  I explained my reasoning and reviewed the MRIs with the patient.  I will tentatively follow the patient up in 2 weeks.  I instructed the patient to call my office with any questions or concerns.      PLAN:    MRI of the brain and cervical spine with and without contrast  CMP, CBC +diff, VZV IgG, JCV+index, remote hepatitis panel, TB quant, Vitamin D, Vitamin b12, folate, TSH, and copper       Finally I will follow the patient up in 2 week(s) as long as the patient is doing well. I instructed the patient to call or mychart my office with any concerns or questions.    I spent 60 minutes in this visit, with >50% direct patient time spent counseling about prognosis, treatment options, and coordination of care.    Aly Mcgowan MD [facility]  Staff Neurologist   08/16/18       (Chart documentation was completed in part with Dragon voice-recognition software. Even though reviewed, some grammatical, spelling, and word errors may remain.)         Again, thank you for allowing me to participate in the care of your patient.        Sincerely,        Aly Mcgowan MD

## 2018-08-16 NOTE — NURSING NOTE
"Marilee Briscoe's goals for this visit include:   Chief Complaint   Patient presents with     Consult     She requests these members of her care team be copied on today's visit information: PCP    PCP: No Ref-Primary, Physician    Referring Provider:  Gabby [address]  [place] [address]    /68 (BP Location: Left arm, Patient Position: Chair, Cuff Size: Adult Regular)  Pulse 77  Ht 1.702 m (5' 7\")  Wt 56.7 kg (125 lb 1.6 oz)  SpO2 98%  BMI 19.59 kg/m2    Do you need any medication refills at today's visit? N  "

## 2018-08-16 NOTE — PATIENT INSTRUCTIONS
Will get a MRI of you brain and spine    Will get blood work    You saw a neurology provider, Aly Mcgowan, today at the [facility].  You may have also met with the MS RN Care Coordinator.  In order to get a message to your MS Center provider, you should contact [phone]. You should contact us via this protocol if you have any of the following symptoms:    New or worsening neurologic symptoms that persist for 24-48 hours, such as:  o New onset of pain or marked worsening of pain  o Difficulty with speech, swallowing, or breathing  o New onset of vertigo or dizziness  o Change in bowel or bladder function (incontinence, difficulty urinating)    Increasing difficulty in self care    Marked changes in vision (double vision, blurred vision, graying of vision)    Change in mobility    Change in cognitive function    Falling    Worsening numbness, tingling or pain with a change in function    Worsening fatigue lasting more than 2 weeks  If you had labs completed today, we will contact you with the results.  If you are active in [facility], they will be released to you there.  Otherwise, your results will be provided to you via mail or telephone call.  Some results take up to 2 weeks for completion.  If you haven t heard anything about your lab results within 2 weeks, you can call or send a [facility] message to obtain your results.  If you have an MRI scheduled in the week or two prior to your next appointment, we will go over the results at your scheduled follow up appointment.  If you are not scheduled to see your MS Center provider within about 2 weeks after your MRI, please call or send a [facility] message to obtain your results if you haven t heard anything within 2 weeks.  Please be aware that it takes at least 5 business days after routine MRIs for your results to be reviewed by both the radiologist and your doctor.  MRIs completed at facilities outside of the [facility] system  take about 2 weeks in order for the MRI disc to be mailed to our clinic and uploaded into your medical record.    Please contact your pharmacy to request refills of your medications.   Please do your best to come to your appointments, and to arrive 15 minutes early to allow time for checking in.  [facility] reserves the right to terminate care of established patients if a patient misses three or more appointments in a clinic without providing notification within a 12-month period.    Developing Your Care Team  Individuals living with chronic illnesses like MS may be unaware that they are at risk for the same range of medical problems as everyone else.  This is why you must establish a relationship with other health care providers in addition to your Multiple Sclerosis doctor.  It can be difficult at times to figure out whether a health concern is related to your MS, or whether it is related to something else, such as hormonal changes, pseduoexacerbations, changes in your core body temperature, flu-like reactions to interferons, exercise, or infections.  Urinary tract infections (UTIs) are common culprits that can cause fatigue, weakness, or other  MS attack -like symptoms without classic symptoms of a UTI.  For this reason, if you call or come in to discuss symptoms, you may be asked to get in touch with your primary provider or another specialist, so that you receive the comprehensive care you need.  What is Multiple Sclerosis (MS)?  MS is a disease in which the nerve tissues in the brain and/or spinal cord are attacked by immune cells in the body.  These immune cells are present in everyone, and their normal role is to fight off infections.  In people with MS, these cells change the way they function and cross into the nervous system.  Once there, they cause inflammation that damages the myelin (or the protective coating of a nerve cell, much like the plastic covering on an electrical cord)  and parts of the nerve cell itself.  So far, a clear cause for this immune system dysfunction has not been found.  MS often starts out as the  relapsing-remitting  form.  This means there are episodes when you have symptoms, and other times when you recover to normal or near-normal.  Over time, if the damage to the nervous system continues, the disease can cause additional disability, such as difficulty walking.  If the relapses and nerve damage can be prevented with available medications, many patients with MS can go many years between relapses and have relatively little disability.  Remember: MS is a condition that changes and must be evaluated on an ongoing basis!  What is a Relapse? (Also called flare-ups, attacks, or exacerbations)  Relapses are due to the occurrence of inflammation in some part of the brain and/or spinal cord.  A relapse is new or recurrent symptoms which persist for at least 24 hours and sometimes worsen over 48 hours.  New symptoms need to be  by at least 1 month in order to be considered separate relapses. Most of the time, symptoms reach their maximal intensity within 2 weeks and then begin to slowly resolve.  At times, your symptoms may not recover fully for up to 6 months, depending on the severity of the episode.  The frequency of relapses is generally higher early in the disease, but can vary greatly among individuals with MS.  Improvement of symptoms for an individual is unpredictable with each relapse.    It is important to remember that an increase in symptoms and changes in function may not necessarily be a relapse.  There are other factors that contribute to such changes, such as hot weather, increased body temperature, infection/illness, stress and sleep deprivation.  The worsening of symptoms may feel like a relapse when in reality it is not.  These episodes are referred to as pseudorelapses.  Once the underlying cause is addressed, symptoms usually fade away and you  feel better.  If you experience a worsening of symptoms that lasts more than 48 hours and does not improve with cooling down, decreasing stress, or treatment of an infection, please call us and we can help to better determine whether you are having a pseudorelapse versus a relapse.

## 2018-08-17 LAB
ANA SER QL IF: NEGATIVE
B BURGDOR IGG+IGM SER QL: 0.06 (ref 0–0.89)
DEPRECATED CALCIDIOL+CALCIFEROL SERPL-MC: 51 UG/L (ref 20–75)
ENA SM IGG SER-ACNC: <0.2 AI (ref 0–0.9)
ENA SS-A IGG SER IA-ACNC: <0.2 AI (ref 0–0.9)
ENA SS-B IGG SER IA-ACNC: 0.2 AI (ref 0–0.9)
HBV CORE AB SERPL QL IA: NONREACTIVE
HBV SURFACE AB SERPL IA-ACNC: 0 M[IU]/ML
HBV SURFACE AG SERPL QL IA: NONREACTIVE
HIV 1+2 AB+HIV1 P24 AG SERPL QL IA: NONREACTIVE
T PALLIDUM AB SER QL: NONREACTIVE
VZV IGG SER QL IA: 5.2 AI (ref 0–0.8)

## 2018-08-18 LAB
ACE SERPL-CCNC: 29 U/L (ref 9–67)
RHEUMATOID FACT SER NEPH-ACNC: <20 IU/ML (ref 0–20)
TTG IGA SER-ACNC: <1 U/ML
TTG IGG SER-ACNC: <1 U/ML

## 2018-08-20 LAB
GAMMA INTERFERON BACKGROUND BLD IA-ACNC: 0.07 IU/ML
M TB IFN-G BLD-IMP: NEGATIVE
M TB IFN-G CD4+ BCKGRND COR BLD-ACNC: 8.98 IU/ML
MISCELLANEOUS TEST: NORMAL
MITOGEN IGNF BCKGRD COR BLD-ACNC: 0 IU/ML
MITOGEN IGNF BCKGRD COR BLD-ACNC: 0 IU/ML
VIT B6 SERPL-MCNC: 134.4 NMOL/L (ref 20–125)

## 2018-08-21 LAB
HCV RNA SERPL NAA+PROBE-ACNC: NORMAL [IU]/ML
HCV RNA SERPL NAA+PROBE-LOG IU: NORMAL LOG IU/ML

## 2018-08-22 LAB — LAB SCANNED RESULT: NORMAL

## 2018-08-27 ENCOUNTER — OFFICE VISIT (OUTPATIENT)
Dept: NEUROLOGY | Facility: CLINIC | Age: 73
End: 2018-08-27
Payer: COMMERCIAL

## 2018-08-27 ENCOUNTER — RADIANT APPOINTMENT (OUTPATIENT)
Dept: MRI IMAGING | Facility: CLINIC | Age: 73
End: 2018-08-27
Attending: PSYCHIATRY & NEUROLOGY
Payer: COMMERCIAL

## 2018-08-27 VITALS
OXYGEN SATURATION: 100 % | DIASTOLIC BLOOD PRESSURE: 62 MMHG | SYSTOLIC BLOOD PRESSURE: 110 MMHG | BODY MASS INDEX: 19.62 KG/M2 | HEART RATE: 90 BPM | HEIGHT: 67 IN | WEIGHT: 125 LBS

## 2018-08-27 DIAGNOSIS — G35 MULTIPLE SCLEROSIS (H): ICD-10-CM

## 2018-08-27 DIAGNOSIS — G35 MS (MULTIPLE SCLEROSIS) (H): Primary | ICD-10-CM

## 2018-08-27 PROCEDURE — 99214 OFFICE O/P EST MOD 30 MIN: CPT | Mod: GC | Performed by: PSYCHIATRY & NEUROLOGY

## 2018-08-27 PROCEDURE — 72157 MRI CHEST SPINE W/O & W/DYE: CPT | Performed by: RADIOLOGY

## 2018-08-27 PROCEDURE — 72156 MRI NECK SPINE W/O & W/DYE: CPT | Performed by: RADIOLOGY

## 2018-08-27 PROCEDURE — A9585 GADOBUTROL INJECTION: HCPCS | Performed by: PSYCHIATRY & NEUROLOGY

## 2018-08-27 PROCEDURE — 70553 MRI BRAIN STEM W/O & W/DYE: CPT | Performed by: RADIOLOGY

## 2018-08-27 RX ORDER — GADOBUTROL 604.72 MG/ML
7.5 INJECTION INTRAVENOUS ONCE
Status: COMPLETED | OUTPATIENT
Start: 2018-08-27 | End: 2018-08-27

## 2018-08-27 RX ORDER — MIRTAZAPINE 15 MG/1
15 TABLET, FILM COATED ORAL AT BEDTIME
Qty: 30 TABLET | Refills: 1 | Status: SHIPPED | OUTPATIENT
Start: 2018-08-27

## 2018-08-27 RX ADMIN — GADOBUTROL 6 ML: 604.72 INJECTION INTRAVENOUS at 13:33

## 2018-08-27 NOTE — PATIENT INSTRUCTIONS
We also discussed a recent trial of very high doses of biotin.  This produced improvement on an objective scale of disability in a modest number of patients with progressive MS (both primary and secondary progressive) treated with the medication.  The percentage of patients responding in the trial was 12%, versus 0% for placebo.  This dose of biotin is most practically obtained from a compounding pharmacy as it is much higher than the dose of biotin typically contained in nutritional supplements.  There were essentially no side effects seen in the trial, but this would be an out-of-pocket expense of about $40 per month.      Can consider Biotin at 10mg daily for one month. If you tolerate this you can order the vaughn dose of 300mg from [facility]'s pharmacy at [email or web address]    Will start the mirtazapine (REMERON) 15 MG tablet every night     Will follow up in 3 months    You saw a neurology provider, Aly Mcgowan, today at the [facility].  You may have also met with the MS RN Care Coordinator.  In order to get a message to your MS Center provider, you should contact [phone]. You should contact us via this protocol if you have any of the following symptoms:    New or worsening neurologic symptoms that persist for 24-48 hours, such as:  o New onset of pain or marked worsening of pain  o Difficulty with speech, swallowing, or breathing  o New onset of vertigo or dizziness  o Change in bowel or bladder function (incontinence, difficulty urinating)    Increasing difficulty in self care    Marked changes in vision (double vision, blurred vision, graying of vision)    Change in mobility    Change in cognitive function    Falling    Worsening numbness, tingling or pain with a change in function    Worsening fatigue lasting more than 2 weeks  If you had labs completed today, we will contact you with the results.  If you are active in [facility], they will be  released to you there.  Otherwise, your results will be provided to you via mail or telephone call.  Some results take up to 2 weeks for completion.  If you haven t heard anything about your lab results within 2 weeks, you can call or send a [facility] message to obtain your results.  If you have an MRI scheduled in the week or two prior to your next appointment, we will go over the results at your scheduled follow up appointment.  If you are not scheduled to see your MS Center provider within about 2 weeks after your MRI, please call or send a [facility] message to obtain your results if you haven t heard anything within 2 weeks.  Please be aware that it takes at least 5 business days after routine MRIs for your results to be reviewed by both the radiologist and your doctor.  MRIs completed at facilities outside of the [facility] system take about 2 weeks in order for the MRI disc to be mailed to our clinic and uploaded into your medical record.    Please contact your pharmacy to request refills of your medications.   Please do your best to come to your appointments, and to arrive 15 minutes early to allow time for checking in.  [facility] reserves the right to terminate care of established patients if a patient misses three or more appointments in a clinic without providing notification within a 12-month period.    Developing Your Care Team  Individuals living with chronic illnesses like MS may be unaware that they are at risk for the same range of medical problems as everyone else.  This is why you must establish a relationship with other health care providers in addition to your Multiple Sclerosis doctor.  It can be difficult at times to figure out whether a health concern is related to your MS, or whether it is related to something else, such as hormonal changes, pseduoexacerbations, changes in your core body temperature, flu-like reactions to interferons, exercise, or infections.  Urinary tract  infections (UTIs) are common culprits that can cause fatigue, weakness, or other  MS attack -like symptoms without classic symptoms of a UTI.  For this reason, if you call or come in to discuss symptoms, you may be asked to get in touch with your primary provider or another specialist, so that you receive the comprehensive care you need.  What is Multiple Sclerosis (MS)?  MS is a disease in which the nerve tissues in the brain and/or spinal cord are attacked by immune cells in the body.  These immune cells are present in everyone, and their normal role is to fight off infections.  In people with MS, these cells change the way they function and cross into the nervous system.  Once there, they cause inflammation that damages the myelin (or the protective coating of a nerve cell, much like the plastic covering on an electrical cord) and parts of the nerve cell itself.  So far, a clear cause for this immune system dysfunction has not been found.  MS often starts out as the  relapsing-remitting  form.  This means there are episodes when you have symptoms, and other times when you recover to normal or near-normal.  Over time, if the damage to the nervous system continues, the disease can cause additional disability, such as difficulty walking.  If the relapses and nerve damage can be prevented with available medications, many patients with MS can go many years between relapses and have relatively little disability.  Remember: MS is a condition that changes and must be evaluated on an ongoing basis!  What is a Relapse? (Also called flare-ups, attacks, or exacerbations)  Relapses are due to the occurrence of inflammation in some part of the brain and/or spinal cord.  A relapse is new or recurrent symptoms which persist for at least 24 hours and sometimes worsen over 48 hours.  New symptoms need to be  by at least 1 month in order to be considered separate relapses. Most of the time, symptoms reach their maximal  intensity within 2 weeks and then begin to slowly resolve.  At times, your symptoms may not recover fully for up to 6 months, depending on the severity of the episode.  The frequency of relapses is generally higher early in the disease, but can vary greatly among individuals with MS.  Improvement of symptoms for an individual is unpredictable with each relapse.    It is important to remember that an increase in symptoms and changes in function may not necessarily be a relapse.  There are other factors that contribute to such changes, such as hot weather, increased body temperature, infection/illness, stress and sleep deprivation.  The worsening of symptoms may feel like a relapse when in reality it is not.  These episodes are referred to as pseudorelapses.  Once the underlying cause is addressed, symptoms usually fade away and you feel better.  If you experience a worsening of symptoms that lasts more than 48 hours and does not improve with cooling down, decreasing stress, or treatment of an infection, please call us and we can help to better determine whether you are having a pseudorelapse versus a relapse.

## 2018-08-27 NOTE — MR AVS SNAPSHOT
After Visit Summary   8/27/2018    Marilee Briscoe    MRN: [id number]           Patient Information     Date Of Birth          1945        Visit Information        Provider Department      8/27/2018 2:00 PM Aly Mcgowan MD [facility]        Today's Diagnoses     MS (multiple sclerosis) (H)    -  1    Depression, unspecified depression type          Care Instructions    We also discussed a recent trial of very high doses of biotin.  This produced improvement on an objective scale of disability in a modest number of patients with progressive MS (both primary and secondary progressive) treated with the medication.  The percentage of patients responding in the trial was 12%, versus 0% for placebo.  This dose of biotin is most practically obtained from a compounding pharmacy as it is much higher than the dose of biotin typically contained in nutritional supplements.  There were essentially no side effects seen in the trial, but this would be an out-of-pocket expense of about $40 per month.      Can consider Biotin at 10mg daily for one month. If you tolerate this you can order the vaughn dose of 300mg from [facility] at [email or web address]/.    Will start the mirtazapine (REMERON) 15 MG tablet every night     Will follow up in 3 months    You saw a neurology provider, Aly Mcgowan, today at the [facility].  You may have also met with the MS RN Care Coordinator.  In order to get a message to your MS Center provider, you should contact [phone]. You should contact us via this protocol if you have any of the following symptoms:    New or worsening neurologic symptoms that persist for 24-48 hours, such as:  o New onset of pain or marked worsening of pain  o Difficulty with speech, swallowing, or breathing  o New onset of vertigo or dizziness  o Change in bowel or bladder function (incontinence, difficulty  urinating)    Increasing difficulty in self care    Marked changes in vision (double vision, blurred vision, graying of vision)    Change in mobility    Change in cognitive function    Falling    Worsening numbness, tingling or pain with a change in function    Worsening fatigue lasting more than 2 weeks  If you had labs completed today, we will contact you with the results.  If you are active in [employer], they will be released to you there.  Otherwise, your results will be provided to you via mail or telephone call.  Some results take up to 2 weeks for completion.  If you haven t heard anything about your lab results within 2 weeks, you can call or send a [employer] message to obtain your results.  If you have an MRI scheduled in the week or two prior to your next appointment, we will go over the results at your scheduled follow up appointment.  If you are not scheduled to see your MS Center provider within about 2 weeks after your MRI, please call or send a [employer] message to obtain your results if you haven t heard anything within 2 weeks.  Please be aware that it takes at least 5 business days after routine MRIs for your results to be reviewed by both the radiologist and your doctor.  MRIs completed at facilities outside of the [facility] system take about 2 weeks in order for the MRI disc to be mailed to our clinic and uploaded into your medical record.    Please contact your pharmacy to request refills of your medications.   Please do your best to come to your appointments, and to arrive 15 minutes early to allow time for checking in.  [facility] reserves the right to terminate care of established patients if a patient misses three or more appointments in a clinic without providing notification within a 12-month period.    Developing Your Care Team  Individuals living with chronic illnesses like MS may be unaware that they are at risk for the same range of medical problems as everyone else.   This is why you must establish a relationship with other health care providers in addition to your Multiple Sclerosis doctor.  It can be difficult at times to figure out whether a health concern is related to your MS, or whether it is related to something else, such as hormonal changes, pseduoexacerbations, changes in your core body temperature, flu-like reactions to interferons, exercise, or infections.  Urinary tract infections (UTIs) are common culprits that can cause fatigue, weakness, or other  MS attack -like symptoms without classic symptoms of a UTI.  For this reason, if you call or come in to discuss symptoms, you may be asked to get in touch with your primary provider or another specialist, so that you receive the comprehensive care you need.  What is Multiple Sclerosis (MS)?  MS is a disease in which the nerve tissues in the brain and/or spinal cord are attacked by immune cells in the body.  These immune cells are present in everyone, and their normal role is to fight off infections.  In people with MS, these cells change the way they function and cross into the nervous system.  Once there, they cause inflammation that damages the myelin (or the protective coating of a nerve cell, much like the plastic covering on an electrical cord) and parts of the nerve cell itself.  So far, a clear cause for this immune system dysfunction has not been found.  MS often starts out as the  relapsing-remitting  form.  This means there are episodes when you have symptoms, and other times when you recover to normal or near-normal.  Over time, if the damage to the nervous system continues, the disease can cause additional disability, such as difficulty walking.  If the relapses and nerve damage can be prevented with available medications, many patients with MS can go many years between relapses and have relatively little disability.  Remember: MS is a condition that changes and must be evaluated on an ongoing basis!  What is  a Relapse? (Also called flare-ups, attacks, or exacerbations)  Relapses are due to the occurrence of inflammation in some part of the brain and/or spinal cord.  A relapse is new or recurrent symptoms which persist for at least 24 hours and sometimes worsen over 48 hours.  New symptoms need to be  by at least 1 month in order to be considered separate relapses. Most of the time, symptoms reach their maximal intensity within 2 weeks and then begin to slowly resolve.  At times, your symptoms may not recover fully for up to 6 months, depending on the severity of the episode.  The frequency of relapses is generally higher early in the disease, but can vary greatly among individuals with MS.  Improvement of symptoms for an individual is unpredictable with each relapse.    It is important to remember that an increase in symptoms and changes in function may not necessarily be a relapse.  There are other factors that contribute to such changes, such as hot weather, increased body temperature, infection/illness, stress and sleep deprivation.  The worsening of symptoms may feel like a relapse when in reality it is not.  These episodes are referred to as pseudorelapses.  Once the underlying cause is addressed, symptoms usually fade away and you feel better.  If you experience a worsening of symptoms that lasts more than 48 hours and does not improve with cooling down, decreasing stress, or treatment of an infection, please call us and we can help to better determine whether you are having a pseudorelapse versus a relapse.                Follow-ups after your visit        Follow-up notes from your care team     Return in about 3 months (around 11/27/2018).      Your next 10 appointments already scheduled     Nov 26, 2018 10:00 AM CST   Return Visit with Aly [facility]gowan MD   [facility]    [id number] [place] [id number] [phone]              Who  "to contact     If you have questions or need follow up information about today's clinic visit or your schedule please contact [facility] directly at [phone].  Normal or non-critical lab and imaging results will be communicated to you by [employer], letter or phone within 4 business days after the clinic has received the results. If you do not hear from us within 7 days, please contact the clinic through [employer] or phone. If you have a critical or abnormal lab result, we will notify you by phone as soon as possible.  Submit refill requests through [employer] or call your pharmacy and they will forward the refill request to us. Please allow 3 business days for your refill to be completed.          Additional Information About Your Visit        [employer] Information     [employer] gives you secure access to your electronic health record. If you see a primary care provider, you can also send messages to your care team and make appointments. If you have questions, please call your primary care clinic.  If you do not have a primary care provider, please call [phone] and they will assist you.      [employer] is an electronic gateway that provides easy, online access to your medical records. With [employer], you can request a clinic appointment, read your test results, renew a prescription or communicate with your care team.     To access your existing account, please contact your [facility] or call [phone] for assistance.        Care EveryWhere ID     This is your Care EveryWhere ID. This could be used by other organizations to access your [facility] medical records  [id number]        Your Vitals Were     Pulse Height Pulse Oximetry BMI (Body Mass Index)          90 1.702 m (5' 7\") 100% 19.58 kg/m2         Blood Pressure from Last 3 Encounters:   08/27/18 110/62   08/16/18 120/68    Weight from Last 3 Encounters:   08/27/18 56.7 kg (125 lb)   08/16/18 56.7 kg (125 lb 1.6 oz) "              Today, you had the following     No orders found for display         Today's Medication Changes          These changes are accurate as of 8/27/18  3:25 PM.  If you have any questions, ask your nurse or doctor.               Start taking these medicines.        Dose/Directions    mirtazapine 15 MG tablet   Commonly known as:  REMERON   Used for:  MS (multiple sclerosis) (H), Depression, unspecified depression type   Started by:  Aly Mcgowan MD        Dose:  15 mg   Take 1 tablet (15 mg) by mouth At Bedtime   Quantity:  30 tablet   Refills:  1            Where to get your medicines      These medications were sent to [place] [address] [place] [address]  [address] [facility] [address]     Phone:  [phone]     mirtazapine 15 MG tablet                Primary Care Provider Fax #    Physician No Ref-Primary [phone]       No address on file        Equal Access to Services     [facility]: Hadii kathleen rangelo Soadolfo, waaxda luqadaha, qaybta kaalmada adeмарияyada, marcelino fulton . So [facility] [phone].    ATENCIÓN: Si habla español, tiene a corona disposición servicios gratuitos de asistencia lingüística. Bony al [phone].    We comply with applicable federal civil rights laws and [place] laws. We do not discriminate on the basis of race, color, national origin, age, disability, sex, sexual orientation, or gender identity.            Thank you!     Thank you for choosing [facility]  for your care. Our goal is always to provide you with excellent care. Hearing back from our patients is one way we can continue to improve our services. Please take a few minutes to complete the written survey that you may receive in the mail after your visit with us. Thank you!             Your Updated Medication List - Protect others around you: Learn how to safely use, store and throw away your medicines at www.disposemymeds.org.           This list is accurate as of 8/27/18  3:25 PM.  Always use your most recent med list.                   Brand Name Dispense Instructions for use Diagnosis    diazepam 5 MG tablet    VALIUM    2 tablet    Take 1 tablet by mouth 30 minutes prior to MRI. Can take 1 tablet at the time of MRI as needed.    Claustrophobia       mirtazapine 15 MG tablet    REMERON    30 tablet    Take 1 tablet (15 mg) by mouth At Bedtime    MS (multiple sclerosis) (H), Depression, unspecified depression type       MULTI ADULT GUMMIES PO

## 2018-08-27 NOTE — NURSING NOTE
"Marilee Briscoe's goals for this visit include:   Chief Complaint   Patient presents with     RECHECK     She requests these members of her care team be copied on today's visit information: PCP    PCP: No Ref-Primary, Physician    Referring Provider:  No referring provider defined for this encounter.    /62 (BP Location: Left arm, Patient Position: Sitting, Cuff Size: Adult Regular)  Pulse 90  Ht 1.702 m (5' 7\")  Wt 56.7 kg (125 lb)  SpO2 100%  BMI 19.58 kg/m2    Do you need any medication refills at today's visit? n  "

## 2018-08-27 NOTE — PROGRESS NOTES
MULTIPLE SCLEROSIS CLINIC AT THE [facility]  FOLLOWUP/ESTABLISHED PATIENT VISIT      PRINCIPAL NEUROLOGIC DIAGNOSIS: Multiple Sclerosis    Date of Onset:    Date of Diagnosis:    Initial Clinical Course: Primary Progressive  Current Clinical Course: Primary Progressive  Past Disease Modifying Therapy(ies): NA  Current Disease Modifying Therapy(ies):NA  Most Recent MRI of the Brain: 18   Most Recent MRI of the Cervical Cord 18   Most Recent MRI of the Thoracic Cord: 18  Most Recent Lumbar Puncture: NA  Most Recent OCT: NA  Most Recent JCV: 18 0.18 Negative   Most Recent Remote Hepatitis Panel: 18 negative  Most Recent VZV Ig18 VZV IgG 5.2  positive  Most Recent TB Quant: 18  negative    CHIEF COMPLAINT: Review of diagnostic testing    INTERVAL HISTORY:  Ms. Briscoe is a 73 y.o. 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 Immediately prior to her visit today, she had repeat MRI imaging of brain and cervical spine with contrasted images, which were reviewed and did not show any new lesions on our assessment.     Clinically, she has been stable since last visit only 2 weeks prior. She reports significant depression and tearfulness after recent visit with Dr. Mcgowan who informed her of his concern for primary progressive multiple sclerosis. She presents with two of her children and her . They have come with a list of question and a large portion of the exam is spent reviewing their concerns regarding the diagnosis, prognosis, and management.    She is not interested in lumbar puncture to confirm diagnosis and increaser her eligibility for clinical trials. She wonders about other treatment options and also whether she should be placed on an antidepressant given her recent tearfulness and depressed mood. Her family also have many questions about progression, which Dr. Mcgowan answered.     Issues with current MS therapy:  Not on DMT    REVIEW OF SYSTEMS:    Mood: worse and depressed  Spasticity:none  Bladder: urgency  Bowel: unchanged  Pain related to today's visit:reviewed on nursing intake documentation  Fatigue: unchanged  Sleep: well/ no problem and sleeps 4 hours per night and then interrupted sleep  Memory/Concentration: unchanged    Otherwise 10 point ROS was neg other than the symptoms noted above.    PAST HISTORY was reviewed and updated:    MEDICATIONS and ALLERGIES were reviewed and updated.    SOCIAL HISTORY was reviewed and updated:    Current living situation: Lives with .   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     PHYSICAL EXAMINATION:   VITAL SIGNS:  B/P: 110/62,  P: 90    GENERAL: Thin woman, affect depressed.   NEUROLOGIC:   MENTAL STATUS: Alert,awake andoriented times four.   CRANIAL NERVES:  Extraocular movements are intact with no internuclear ophthalmoplegia. No nystagmus.   POWER:   No pronator drift.     SENSORY:   Intact to light touch throughout     MOTOR/CEREBELLAR:   Intact bilateral FNF.     RESULTS:  Monitoring labs:    Office Visit on 08/16/2018   Component Date Value Ref Range Status     Hepatitis B Core Violetta 08/16/2018 Nonreactive  NR^Nonreactive Final     Hepatitis B Surface Antibody 08/16/2018 0.00  <8.00 m[IU]/mL Final     Hep B Surface Agn 08/16/2018 Nonreactive  NR^Nonreactive Final     HCV RNA Quant IU/ml 08/16/2018 HCV RNA Not Detected  HCVND^HCV RNA Not Detected [IU]/mL Final     Log of HCV RNA Qt 08/16/2018 Not Calculated  <1.2 Log IU/mL Final     HIV Antigen Antibody Combo 08/16/2018 Nonreactive  NR^Nonreactive     Final     WBC 08/16/2018 6.6  4.0 - 11.0 10e9/L Final     RBC Count 08/16/2018 4.84  3.8 - 5.2 10e12/L Final     Hemoglobin 08/16/2018 13.8  11.7 - 15.7 g/dL Final     Hematocrit 08/16/2018 43.2  35.0 - 47.0 % Final     MCV 08/16/2018 89  78 - 100  fl Final     MCH 08/16/2018 28.5  26.5 - 33.0 pg Final     MCHC 08/16/2018 31.9  31.5 - 36.5 g/dL Final     RDW 08/16/2018 13.9  10.0 - 15.0 % Final     Platelet Count 08/16/2018 259  150 - 450 10e9/L Final     Diff Method 08/16/2018 Automated Method   Final     % Neutrophils 08/16/2018 55.0  % Final     % Lymphocytes 08/16/2018 37.0  % Final     % Monocytes 08/16/2018 6.1  % Final     % Eosinophils 08/16/2018 0.5  % Final     % Basophils 08/16/2018 1.1  % Final     % Immature Granulocytes 08/16/2018 0.3  % Final     Absolute Neutrophil 08/16/2018 3.6  1.6 - 8.3 10e9/L Final     Absolute Lymphocytes 08/16/2018 2.4  0.8 - 5.3 10e9/L Final     Absolute Monocytes 08/16/2018 0.4  0.0 - 1.3 10e9/L Final     Absolute Eosinophils 08/16/2018 0.0  0.0 - 0.7 10e9/L Final     Absolute Basophils 08/16/2018 0.1  0.0 - 0.2 10e9/L Final     Abs Immature Granulocytes 08/16/2018 0.0  0 - 0.4 10e9/L Final     Sodium 08/16/2018 141  133 - 144 mmol/L Final     Potassium 08/16/2018 4.6  3.4 - 5.3 mmol/L Final     Chloride 08/16/2018 104  94 - 109 mmol/L Final     Carbon Dioxide 08/16/2018 31  20 - 32 mmol/L Final     Anion Gap 08/16/2018 6  3 - 14 mmol/L Final     Glucose 08/16/2018 125* 70 - 99 mg/dL Final     Urea Nitrogen 08/16/2018 10  7 - 30 mg/dL Final     Creatinine 08/16/2018 0.73  0.52 - 1.04 mg/dL Final     GFR Estimate 08/16/2018 78  >60 mL/min/1.7m2 Final     GFR Estimate If Black 08/16/2018 >90  >60 mL/min/1.7m2 Final     Calcium 08/16/2018 9.2  8.5 - 10.1 mg/dL Final     Bilirubin Total 08/16/2018 0.5  0.2 - 1.3 mg/dL Final     Albumin 08/16/2018 4.4  3.4 - 5.0 g/dL Final     Protein Total 08/16/2018 7.7  6.8 - 8.8 g/dL Final     Alkaline Phosphatase 08/16/2018 65  40 - 150 U/L Final     ALT 08/16/2018 24  0 - 50 U/L Final     AST 08/16/2018 17  0 - 45 U/L Final     AL interpretation 08/16/2018 Negative  NEG^Negative Final     Angiotensin Converting Enzyme 08/16/2018 29  9 - 67 U/L Final     Lab Scanned Result 08/16/2018  ALEX VIR AB INDEX REFLEX-Scanned   Final     Miscellaneous Test 08/16/2018      Final                    Value:Specimen Received, Reordered and sent to Performing laboratory - Report to follow upon   completion.       Lyme Disease Antibodies Serum 08/16/2018 0.06  0.00 - 0.89 Final     Varicella Zoster Virus Antibody IgG 08/16/2018 5.2* 0.0 - 0.8 AI Final     TSH 08/16/2018 3.18  0.40 - 4.00 mU/L Final     Vitamin B12 08/16/2018 569  193 - 986 pg/mL Final     Vitamin B6 08/16/2018 134.4* 20.0 - 125.0 nmol/L Final     Vitamin D Deficiency screening 08/16/2018 51  20 - 75 ug/L Final     Mcgowan YESSY Antibody IgG 08/16/2018 <0.2  0.0 - 0.9 AI Final     Rheumatoid Factor 08/16/2018 <20  <20 IU/mL Final     SSA (Ro) (YESSY) Antibody, IgG 08/16/2018 <0.2  0.0 - 0.9 AI Final     SSB (La) (YESSY) Antibody, IgG 08/16/2018 0.2  0.0 - 0.9 AI Final     Tissue Transglutaminase Antibody I* 08/16/2018 <1  <7 U/mL Final     Tissue Transglutaminase Violetta IgG 08/16/2018 <1  <7 U/mL Final     Treponema Antibodies 08/16/2018 Nonreactive  NR^Nonreactive Final     Quantiferon-TB Gold Plus Result 08/16/2018 Negative  NEG^Negative Final     TB1 Ag minus Nil Value 08/16/2018 0.00  IU/mL Final     TB2 Ag minus Nil Value 08/16/2018 0.00  IU/mL Final     Mitogen minus Nil Result 08/16/2018 8.98  IU/mL Final     Nil Result 08/16/2018 0.07  IU/mL Final       MRI brain with and without   Impression:   1. The study demonstrates 5-10 foci of T2-hyperintensity within the  cerebral white matter which may represent demyelinating disease.   2. There is no abnormal enhancement noted.   3. There is no abnormal interval change from the prior study.   4. Left maxillary sinusitis.    MRI cervical spine with and without   Impression:   1. Cervical spine: The study demonstrates 6 foci of T2 hyperintensity  within the cerebral white matter, which are consistent with clinical  suspicion of demyelinating disease. There is no abnormal enhancement  noted. No interval change  from 6/2/2016.  2. Multilevel cervical spondylosis, with mild neural foraminal  stenosis on the left from C2 through C5 and bilaterally at C6-7. Mild  spinal canal stenosis at C5-6 and C6-7.  3. Thoracic spine: No abnormal enhancement, cord abnormality, or  spinal canal or foraminal stenosis. T2 weighted axial images were not  performed. Patient may return for axial T2-weighted imaging at no  additional charge.    ASSESSMENT/PLAN:  Ms. Hunt is a 73 y.o. Woman who presents for follow-up of suspected primary progressive multiple sclerosis. Today's visit is focused largely on review of imaging and disease management. Clinically, there have been no changes since her last visit with Dr. Mcgowan. She has been more depressed and sad given the new diagnosis. She and her families questions were answered to satisfaction. She is not interested in having LP to confirm diagnosis. Given the MRI findings of T2 hyperintensities in her brain as well as spinal cord, multiple sclerosis is thought to be very likely especially given negative work-up to alternative etiology. She is not interested in clinical trials especially any that involve frequent injections. She and her family are interested in considering alternative treatments as well as symptomatic management for depression/sleep/appetite. We will start biotin with the hope for modest benefit that was demonstrated in clinical trials for patients with primary progressive MS. We will also start mirtazapine for mood, sleep and appetite.    - Biotin 300 mg daily, informed of risk for making a troponin falsely negative and importance of telling her providers that she is on high dose biotin.   - Mirtazapine 15 mg nighty   - Follow-up in 3 months     Domonique Angel MD  Neurology Resident PGY 2      I saw and examined this patient, and have read and edited the resident's note.  I agree with the findings, assessment, and plan.    Aly Mcgowan  Staff Neurologist   08/30/18

## 2018-08-27 NOTE — LETTER
2018         RE: Marilee Briscoe  [address] [facility] [address]        Dear Colleague,    Thank you for referring your patient, Marilee Briscoe, to the [facility]. Please see a copy of my visit note below.    MULTIPLE SCLEROSIS CLINIC AT THE [facility]  FOLLOWUP/ESTABLISHED PATIENT VISIT      PRINCIPAL NEUROLOGIC DIAGNOSIS: Multiple Sclerosis    Date of Onset:    Date of Diagnosis:    Initial Clinical Course: Primary Progressive  Current Clinical Course: Primary Progressive  Past Disease Modifying Therapy(ies): NA  Current Disease Modifying Therapy(ies):NA  Most Recent MRI of the Brain: 18   Most Recent MRI of the Cervical Cord 18   Most Recent MRI of the Thoracic Cord: 18  Most Recent Lumbar Puncture: NA  Most Recent OCT: NA  Most Recent JCV: 18 0.18 Negative   Most Recent Remote Hepatitis Panel: 18 negative  Most Recent VZV Ig18 VZV IgG 5.2  positive  Most Recent TB Quant: 18  negative    CHIEF COMPLAINT: Review of diagnostic testing    INTERVAL HISTORY:  Ms. Briscoe is a 73 y.o. 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 Immediately prior to her visit today, she had repeat MRI imaging of brain and cervical spine with contrasted images, which were reviewed and did not show any new lesions on our assessment.     Clinically, she has been stable since last visit only 2 weeks prior. She reports significant depression and tearfulness after recent visit with Dr. Mcgowan who informed her of his concern for primary progressive multiple sclerosis. She presents with two of her children and her . They have come with a list of question and a large portion of the exam is spent reviewing their concerns regarding the diagnosis, prognosis, and management.    She is not interested in lumbar puncture to confirm diagnosis and increaser her eligibility for clinical trials. She wonders  about other treatment options and also whether she should be placed on an antidepressant given her recent tearfulness and depressed mood. Her family also have many questions about progression, which Dr. Mcgowan answered.     Issues with current MS therapy: Not on DMT    REVIEW OF SYSTEMS:    Mood: worse and depressed  Spasticity:none  Bladder: urgency  Bowel: unchanged  Pain related to today's visit:reviewed on nursing intake documentation  Fatigue: unchanged  Sleep: well/ no problem and sleeps 4 hours per night and then interrupted sleep  Memory/Concentration: unchanged    Otherwise 10 point ROS was neg other than the symptoms noted above.    PAST HISTORY was reviewed and updated:    MEDICATIONS and ALLERGIES were reviewed and updated.    SOCIAL HISTORY was reviewed and updated:    Current living situation: Lives with .   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     PHYSICAL EXAMINATION:   VITAL SIGNS:  B/P: 110/62,  P: 90    GENERAL: Thin woman, affect depressed.   NEUROLOGIC:   MENTAL STATUS: Alert,awake andoriented times four.   CRANIAL NERVES:  Extraocular movements are intact with no internuclear ophthalmoplegia. No nystagmus.   POWER:   No pronator drift.     SENSORY:   Intact to light touch throughout     MOTOR/CEREBELLAR:   Intact bilateral FNF.     RESULTS:  Monitoring labs:    Office Visit on 08/16/2018   Component Date Value Ref Range Status     Hepatitis B Core Violetta 08/16/2018 Nonreactive  NR^Nonreactive Final     Hepatitis B Surface Antibody 08/16/2018 0.00  <8.00 m[IU]/mL Final     Hep B Surface Agn 08/16/2018 Nonreactive  NR^Nonreactive Final     HCV RNA Quant IU/ml 08/16/2018 HCV RNA Not Detected  HCVND^HCV RNA Not Detected [IU]/mL Final     Log of HCV RNA Qt 08/16/2018 Not Calculated  <1.2 Log IU/mL Final     HIV Antigen Antibody Combo 08/16/2018 Nonreactive   NR^Nonreactive     Final     WBC 08/16/2018 6.6  4.0 - 11.0 10e9/L Final     RBC Count 08/16/2018 4.84  3.8 - 5.2 10e12/L Final     Hemoglobin 08/16/2018 13.8  11.7 - 15.7 g/dL Final     Hematocrit 08/16/2018 43.2  35.0 - 47.0 % Final     MCV 08/16/2018 89  78 - 100 fl Final     MCH 08/16/2018 28.5  26.5 - 33.0 pg Final     MCHC 08/16/2018 31.9  31.5 - 36.5 g/dL Final     RDW 08/16/2018 13.9  10.0 - 15.0 % Final     Platelet Count 08/16/2018 259  150 - 450 10e9/L Final     Diff Method 08/16/2018 Automated Method   Final     % Neutrophils 08/16/2018 55.0  % Final     % Lymphocytes 08/16/2018 37.0  % Final     % Monocytes 08/16/2018 6.1  % Final     % Eosinophils 08/16/2018 0.5  % Final     % Basophils 08/16/2018 1.1  % Final     % Immature Granulocytes 08/16/2018 0.3  % Final     Absolute Neutrophil 08/16/2018 3.6  1.6 - 8.3 10e9/L Final     Absolute Lymphocytes 08/16/2018 2.4  0.8 - 5.3 10e9/L Final     Absolute Monocytes 08/16/2018 0.4  0.0 - 1.3 10e9/L Final     Absolute Eosinophils 08/16/2018 0.0  0.0 - 0.7 10e9/L Final     Absolute Basophils 08/16/2018 0.1  0.0 - 0.2 10e9/L Final     Abs Immature Granulocytes 08/16/2018 0.0  0 - 0.4 10e9/L Final     Sodium 08/16/2018 141  133 - 144 mmol/L Final     Potassium 08/16/2018 4.6  3.4 - 5.3 mmol/L Final     Chloride 08/16/2018 104  94 - 109 mmol/L Final     Carbon Dioxide 08/16/2018 31  20 - 32 mmol/L Final     Anion Gap 08/16/2018 6  3 - 14 mmol/L Final     Glucose 08/16/2018 125* 70 - 99 mg/dL Final     Urea Nitrogen 08/16/2018 10  7 - 30 mg/dL Final     Creatinine 08/16/2018 0.73  0.52 - 1.04 mg/dL Final     GFR Estimate 08/16/2018 78  >60 mL/min/1.7m2 Final     GFR Estimate If Black 08/16/2018 >90  >60 mL/min/1.7m2 Final     Calcium 08/16/2018 9.2  8.5 - 10.1 mg/dL Final     Bilirubin Total 08/16/2018 0.5  0.2 - 1.3 mg/dL Final     Albumin 08/16/2018 4.4  3.4 - 5.0 g/dL Final     Protein Total 08/16/2018 7.7  6.8 - 8.8 g/dL Final     Alkaline Phosphatase 08/16/2018  65  40 - 150 U/L Final     ALT 08/16/2018 24  0 - 50 U/L Final     AST 08/16/2018 17  0 - 45 U/L Final     AL interpretation 08/16/2018 Negative  NEG^Negative Final     Angiotensin Converting Enzyme 08/16/2018 29  9 - 67 U/L Final     Lab Scanned Result 08/16/2018 ALEX VIR AB INDEX REFLEX-Scanned   Final     Miscellaneous Test 08/16/2018      Final                    Value:Specimen Received, Reordered and sent to Performing laboratory - Report to follow upon   completion.       Lyme Disease Antibodies Serum 08/16/2018 0.06  0.00 - 0.89 Final     Varicella Zoster Virus Antibody IgG 08/16/2018 5.2* 0.0 - 0.8 AI Final     TSH 08/16/2018 3.18  0.40 - 4.00 mU/L Final     Vitamin B12 08/16/2018 569  193 - 986 pg/mL Final     Vitamin B6 08/16/2018 134.4* 20.0 - 125.0 nmol/L Final     Vitamin D Deficiency screening 08/16/2018 51  20 - 75 ug/L Final     Mcgowan YESSY Antibody IgG 08/16/2018 <0.2  0.0 - 0.9 AI Final     Rheumatoid Factor 08/16/2018 <20  <20 IU/mL Final     SSA (Ro) (YESSY) Antibody, IgG 08/16/2018 <0.2  0.0 - 0.9 AI Final     SSB (La) (YESSY) Antibody, IgG 08/16/2018 0.2  0.0 - 0.9 AI Final     Tissue Transglutaminase Antibody I* 08/16/2018 <1  <7 U/mL Final     Tissue Transglutaminase Violetta IgG 08/16/2018 <1  <7 U/mL Final     Treponema Antibodies 08/16/2018 Nonreactive  NR^Nonreactive Final     Quantiferon-TB Gold Plus Result 08/16/2018 Negative  NEG^Negative Final     TB1 Ag minus Nil Value 08/16/2018 0.00  IU/mL Final     TB2 Ag minus Nil Value 08/16/2018 0.00  IU/mL Final     Mitogen minus Nil Result 08/16/2018 8.98  IU/mL Final     Nil Result 08/16/2018 0.07  IU/mL Final       MRI brain with and without   Impression:   1. The study demonstrates 5-10 foci of T2-hyperintensity within the  cerebral white matter which may represent demyelinating disease.   2. There is no abnormal enhancement noted.   3. There is no abnormal interval change from the prior study.   4. Left maxillary sinusitis.    MRI cervical spine with  and without   Impression:   1. Cervical spine: The study demonstrates 6 foci of T2 hyperintensity  within the cerebral white matter, which are consistent with clinical  suspicion of demyelinating disease. There is no abnormal enhancement  noted. No interval change from 6/2/2016.  2. Multilevel cervical spondylosis, with mild neural foraminal  stenosis on the left from C2 through C5 and bilaterally at C6-7. Mild  spinal canal stenosis at C5-6 and C6-7.  3. Thoracic spine: No abnormal enhancement, cord abnormality, or  spinal canal or foraminal stenosis. T2 weighted axial images were not  performed. Patient may return for axial T2-weighted imaging at no  additional charge.    ASSESSMENT/PLAN:  Ms. Hunt is a 73 y.o. Woman who presents for follow-up of suspected primary progressive multiple sclerosis. Today's visit is focused largely on review of imaging and disease management. Clinically, there have been no changes since her last visit with Dr. Mcgowan. She has been more depressed and sad given the new diagnosis. She and her families questions were answered to satisfaction. She is not interested in having LP to confirm diagnosis. Given the MRI findings of T2 hyperintensities in her brain as well as spinal cord, multiple sclerosis is thought to be very likely especially given negative work-up to alternative etiology. She is not interested in clinical trials especially any that involve frequent injections. She and her family are interested in considering alternative treatments as well as symptomatic management for depression/sleep/appetite. We will start biotin with the hope for modest benefit that was demonstrated in clinical trials for patients with primary progressive MS. We will also start mirtazapine for mood, sleep and appetite.    - Biotin 300 mg daily, informed of risk for making a troponin falsely negative and importance of telling her providers that she is on high dose biotin.   - Mirtazapine 15 mg nighty    - Follow-up in 3 months     Domonique Angel MD  Neurology Resident PGY 2      I saw and examined this patient, and have read and edited the resident's note.  I agree with the findings, assessment, and plan.    Aly Mcgowan  Staff Neurologist   08/30/18           Again, thank you for allowing me to participate in the care of your patient.        Sincerely,        Aly Mcgowan MD

## 2018-08-29 LAB
RESULT: NORMAL
SEND OUTS MISC TEST CODE: NORMAL
SEND OUTS MISC TEST SPECIMEN: NORMAL
TEST NAME: NORMAL

## 2019-11-06 ENCOUNTER — HEALTH MAINTENANCE LETTER (OUTPATIENT)
Age: 74
End: 2019-11-06

## 2020-02-16 ENCOUNTER — HEALTH MAINTENANCE LETTER (OUTPATIENT)
Age: 75
End: 2020-02-16

## 2020-11-29 ENCOUNTER — HEALTH MAINTENANCE LETTER (OUTPATIENT)
Age: 75
End: 2020-11-29

## 2021-04-10 ENCOUNTER — HEALTH MAINTENANCE LETTER (OUTPATIENT)
Age: 76
End: 2021-04-10

## 2021-09-19 ENCOUNTER — HEALTH MAINTENANCE LETTER (OUTPATIENT)
Age: 76
End: 2021-09-19

## 2022-05-01 ENCOUNTER — HEALTH MAINTENANCE LETTER (OUTPATIENT)
Age: 77
End: 2022-05-01

## 2022-11-21 ENCOUNTER — HEALTH MAINTENANCE LETTER (OUTPATIENT)
Age: 77
End: 2022-11-21

## 2023-06-02 ENCOUNTER — HEALTH MAINTENANCE LETTER (OUTPATIENT)
Age: 78
End: 2023-06-02

## 2025-03-29 ENCOUNTER — HEALTH MAINTENANCE LETTER (OUTPATIENT)
Age: 80
End: 2025-03-29